# Patient Record
Sex: FEMALE | Race: BLACK OR AFRICAN AMERICAN | NOT HISPANIC OR LATINO | Employment: FULL TIME | URBAN - METROPOLITAN AREA
[De-identification: names, ages, dates, MRNs, and addresses within clinical notes are randomized per-mention and may not be internally consistent; named-entity substitution may affect disease eponyms.]

---

## 2018-01-29 ENCOUNTER — HOSPITAL ENCOUNTER (EMERGENCY)
Facility: HOSPITAL | Age: 33
Discharge: HOME | End: 2018-01-30
Attending: EMERGENCY MEDICINE
Payer: COMMERCIAL

## 2018-01-29 DIAGNOSIS — K62.5 RECTAL BLEEDING: Primary | ICD-10-CM

## 2018-01-29 DIAGNOSIS — K57.30 DIVERTICULA OF COLON: ICD-10-CM

## 2018-01-29 LAB
ANION GAP SERPL CALC-SCNC: 8 MMOL/L
B-HCG UR QL: NEGATIVE
BUN SERPL-MCNC: 4 MG/DL
CALCIUM SERPL-MCNC: 9.3 MG/DL
CHLORIDE SERPL-SCNC: 104 MMOL/L
CO2 SERPL-SCNC: 27 MMOL/L
CREAT SERPL-MCNC: 0.8 MG/DL
CTP QC/QA: YES
EST. GFR  (AFRICAN AMERICAN): >60 ML/MIN/1.73 M^2
EST. GFR  (NON AFRICAN AMERICAN): >60 ML/MIN/1.73 M^2
GLUCOSE SERPL-MCNC: 101 MG/DL
POTASSIUM SERPL-SCNC: 3.5 MMOL/L
SODIUM SERPL-SCNC: 139 MMOL/L

## 2018-01-29 PROCEDURE — 81025 URINE PREGNANCY TEST: CPT | Performed by: EMERGENCY MEDICINE

## 2018-01-29 PROCEDURE — 80048 BASIC METABOLIC PNL TOTAL CA: CPT

## 2018-01-29 PROCEDURE — 85025 COMPLETE CBC W/AUTO DIFF WBC: CPT

## 2018-01-29 PROCEDURE — 99284 EMERGENCY DEPT VISIT MOD MDM: CPT | Mod: 25

## 2018-01-30 VITALS
HEART RATE: 64 BPM | HEIGHT: 69 IN | TEMPERATURE: 98 F | OXYGEN SATURATION: 100 % | RESPIRATION RATE: 16 BRPM | BODY MASS INDEX: 37.03 KG/M2 | DIASTOLIC BLOOD PRESSURE: 78 MMHG | SYSTOLIC BLOOD PRESSURE: 129 MMHG | WEIGHT: 250 LBS

## 2018-01-30 LAB
BASOPHILS # BLD AUTO: 0.01 K/UL
BASOPHILS NFR BLD: 0.2 %
DIFFERENTIAL METHOD: ABNORMAL
EOSINOPHIL # BLD AUTO: 0.2 K/UL
EOSINOPHIL NFR BLD: 3.7 %
ERYTHROCYTE [DISTWIDTH] IN BLOOD BY AUTOMATED COUNT: 15.7 %
HCT VFR BLD AUTO: 36.7 %
HGB BLD-MCNC: 11.9 G/DL
LYMPHOCYTES # BLD AUTO: 1.6 K/UL
LYMPHOCYTES NFR BLD: 36 %
MCH RBC QN AUTO: 26.2 PG
MCHC RBC AUTO-ENTMCNC: 32.4 G/DL
MCV RBC AUTO: 81 FL
MONOCYTES # BLD AUTO: 0.4 K/UL
MONOCYTES NFR BLD: 9.6 %
NEUTROPHILS # BLD AUTO: 2.3 K/UL
NEUTROPHILS NFR BLD: 50.9 %
PLATELET # BLD AUTO: 254 K/UL
PMV BLD AUTO: 12.7 FL
RBC # BLD AUTO: 4.55 M/UL
WBC # BLD AUTO: 4.56 K/UL

## 2018-01-30 NOTE — ED PROVIDER NOTES
"Encounter Date: 1/29/2018    SCRIBE #1 NOTE: I, Benjamin Lozano, am scribing for, and in the presence of,  Herbie Mora III, MD. I have scribed the following portions of the note - Other sections scribed: HPI, ROS.       History     Chief Complaint   Patient presents with    Rectal Bleeding     x3 days, lower abd pain intermittent (crampy feeling) that started Sunday; shooting abd pains Sunday, but not now; denies hx hemmorrhoids     CC: Rectal Bleeding    HPI: This 32 y.o. Female with no pertinent PMHx presents to the ED c/o rectal bleeding, anal pain, lower abdominal shooting pain, diarrhea, blood in stool and nausea which began x3 days ago.  She reports "I feel like I have to BM but just bleeding occurs."  Pt has not taken any medications to relieve her symptoms. He denies emesis, dysuria or hemtauria.      The history is provided by the patient. No  was used.     Review of patient's allergies indicates:   Allergen Reactions    Iodine and iodide containing products Swelling     History reviewed. No pertinent past medical history.  History reviewed. No pertinent surgical history.  History reviewed. No pertinent family history.  Social History   Substance Use Topics    Smoking status: Current Every Day Smoker     Packs/day: 0.50     Years: 10.00     Types: Cigarettes    Smokeless tobacco: Never Used    Alcohol use Yes      Comment: ocassionally     Review of Systems   Constitutional: Negative for chills and fever.   HENT: Negative for ear pain, rhinorrhea and sore throat.    Eyes: Negative for redness.   Respiratory: Negative for shortness of breath.    Cardiovascular: Negative for chest pain.   Gastrointestinal: Positive for abdominal pain (lower), anal bleeding (and pain), blood in stool, diarrhea and nausea. Negative for vomiting.   Genitourinary: Negative for dysuria and hematuria.   Musculoskeletal: Negative for back pain and neck pain.   Skin: Negative for rash.   Neurological: " Negative for weakness, numbness and headaches.   Hematological: Does not bruise/bleed easily.   Psychiatric/Behavioral: The patient is not nervous/anxious.        Physical Exam     Initial Vitals [01/29/18 1923]   BP Pulse Resp Temp SpO2   (!) 141/82 100 16 98.3 °F (36.8 °C) 99 %      MAP       101.67         Physical Exam    Nursing note and vitals reviewed.  Constitutional: She appears well-developed and well-nourished. She is not diaphoretic. No distress.   HENT:   Head: Normocephalic and atraumatic.   Nose: Nose normal.   Mouth/Throat: Oropharynx is clear and moist. No oropharyngeal exudate.   Eyes: Conjunctivae and EOM are normal. Pupils are equal, round, and reactive to light. No scleral icterus.   Neck: Normal range of motion. Neck supple. No thyromegaly present. No tracheal deviation present.   Cardiovascular: Normal rate, regular rhythm and normal heart sounds. Exam reveals no gallop and no friction rub.    No murmur heard.  Pulmonary/Chest: Breath sounds normal. No respiratory distress. She has no wheezes. She has no rhonchi. She has no rales.   Abdominal: Soft. Bowel sounds are normal. She exhibits no distension and no mass. There is tenderness (slight LLQ). There is no rebound and no guarding.   Musculoskeletal: Normal range of motion. She exhibits no edema or tenderness.   Lymphadenopathy:     She has no cervical adenopathy.   Neurological: She is alert and oriented to person, place, and time. She has normal strength. No cranial nerve deficit or sensory deficit.   Skin: Skin is warm and dry. No rash noted. No erythema. No pallor.   Psychiatric: She has a normal mood and affect. Her behavior is normal. Thought content normal.         ED Course   Procedures  Labs Reviewed   CBC W/ AUTO DIFFERENTIAL - Abnormal; Notable for the following:        Result Value    Hemoglobin 11.9 (*)     Hematocrit 36.7 (*)     MCV 81 (*)     MCH 26.2 (*)     RDW 15.7 (*)     All other components within normal limits   BASIC  METABOLIC PANEL - Abnormal; Notable for the following:     BUN, Bld 4 (*)     All other components within normal limits   POCT URINE PREGNANCY             Medical Decision Making:   Initial Assessment:   32-year-old female presents complaining of bright red blood per rectum with bowel movements as detailed above.  Work this began with a cramping low abdominal pain but that this has largely resolved.  Physical examination reveals slight left lower quadrant tenderness and no other significant abnormalities.  Patient has no signs or symptoms of anemia.  Differential Diagnosis:   Diverticulosis, diverticulitis.  Hemorrhoids are considered much less likely given bleeding only occurs with bowel movements.  Malignancy considered less likely given young age.  Infectious enteritis felt less likely given lack of vomiting and risk factors for bacterial infection.  Independently Interpreted Test(s):   I have ordered and independently interpreted X-rays - see summary below.       <> Summary of X-Ray Reading(s): CT abdomen: Diverticulosis with no other abnormalities  ED Management:  Workup does not reveal any acute pathology.  Hemoglobin 11.9 and patient reports that she is chronically anemic.  CT with diverticulosis though without any evidence of diverticulitis.  Will recommend follow-up with gastroenterology for colonoscopy and provided return precautions for developing signs of severe anemia, diverticulitis, or other abdominal emergency.    Patient counseled regarding test results, recommendations for supportive care, and need for follow-up.  Return precautions given.              Scribe Attestation:   Scribe #1: I performed the above scribed service and the documentation accurately describes the services I performed. I attest to the accuracy of the note.    Attending Attestation:           Physician Attestation for Scribe:  Physician Attestation Statement for Scribe #1: I, Herbie Mora III, MD, reviewed documentation, as  scribed by Benjamin Lozano in my presence, and it is both accurate and complete.                 ED Course      Clinical Impression:   The primary encounter diagnosis was Rectal bleeding. A diagnosis of Diverticula of colon was also pertinent to this visit.                           Herbie Mora III, MD  01/30/18 0315

## 2018-01-30 NOTE — DISCHARGE INSTRUCTIONS
Return to the emergency department if you develop worsening pain, worsening bleeding, severe lightheadedness, fainting, shortness of breath with exertion, fever higher than 100.4°, or for any new or worsening medical concerns.  As discussed, you need to follow-up with a gastroenterologist to have a colonoscopy.

## 2018-06-12 ENCOUNTER — HOSPITAL ENCOUNTER (EMERGENCY)
Facility: HOSPITAL | Age: 33
Discharge: HOME | End: 2018-06-12
Attending: EMERGENCY MEDICINE
Payer: COMMERCIAL

## 2018-06-12 VITALS
OXYGEN SATURATION: 99 % | HEIGHT: 65 IN | HEART RATE: 73 BPM | SYSTOLIC BLOOD PRESSURE: 124 MMHG | DIASTOLIC BLOOD PRESSURE: 79 MMHG | WEIGHT: 250 LBS | BODY MASS INDEX: 41.65 KG/M2 | RESPIRATION RATE: 18 BRPM | TEMPERATURE: 99 F

## 2018-06-12 DIAGNOSIS — R07.9 RIGHT-SIDED CHEST PAIN: ICD-10-CM

## 2018-06-12 DIAGNOSIS — M54.9 UPPER BACK PAIN ON RIGHT SIDE: Primary | ICD-10-CM

## 2018-06-12 DIAGNOSIS — M25.511 ACUTE PAIN OF RIGHT SHOULDER: ICD-10-CM

## 2018-06-12 LAB
B-HCG UR QL: NEGATIVE
CTP QC/QA: YES

## 2018-06-12 PROCEDURE — 99284 EMERGENCY DEPT VISIT MOD MDM: CPT | Mod: 25

## 2018-06-12 PROCEDURE — 63600175 PHARM REV CODE 636 W HCPCS: Performed by: PHYSICIAN ASSISTANT

## 2018-06-12 PROCEDURE — 81025 URINE PREGNANCY TEST: CPT | Performed by: PHYSICIAN ASSISTANT

## 2018-06-12 PROCEDURE — 96372 THER/PROPH/DIAG INJ SC/IM: CPT

## 2018-06-12 RX ORDER — KETOROLAC TROMETHAMINE 30 MG/ML
15 INJECTION, SOLUTION INTRAMUSCULAR; INTRAVENOUS
Status: COMPLETED | OUTPATIENT
Start: 2018-06-12 | End: 2018-06-12

## 2018-06-12 RX ORDER — ORPHENADRINE CITRATE 100 MG/1
100 TABLET, EXTENDED RELEASE ORAL 2 TIMES DAILY
Qty: 20 TABLET | Refills: 0 | Status: SHIPPED | OUTPATIENT
Start: 2018-06-12 | End: 2018-06-22

## 2018-06-12 RX ORDER — ORPHENADRINE CITRATE 30 MG/ML
60 INJECTION INTRAMUSCULAR; INTRAVENOUS
Status: COMPLETED | OUTPATIENT
Start: 2018-06-12 | End: 2018-06-12

## 2018-06-12 RX ORDER — IBUPROFEN 600 MG/1
600 TABLET ORAL EVERY 6 HOURS PRN
Qty: 20 TABLET | Refills: 0 | Status: SHIPPED | OUTPATIENT
Start: 2018-06-12

## 2018-06-12 RX ADMIN — KETOROLAC TROMETHAMINE 15 MG: 30 INJECTION, SOLUTION INTRAMUSCULAR; INTRAVENOUS at 01:06

## 2018-06-12 RX ADMIN — ORPHENADRINE CITRATE 60 MG: 30 INJECTION INTRAMUSCULAR; INTRAVENOUS at 01:06

## 2018-06-12 NOTE — ED PROVIDER NOTES
Encounter Date: 6/12/2018    SCRIBE #1 NOTE: I, Martita Acosta, am scribing for, and in the presence of,  Rich Amin PA-C. I have scribed the following portions of the note - Other sections scribed: HPI and ROS.       History     Chief Complaint   Patient presents with    Back Pain     pt c/o right upper back/shoulder pain since yest, hurts to move, she thinks its from lifting at work     CC: Back Pain    HPI: This 33 y.o female presents to the ED c/o gradual onset, constant right upper back pain radiating to under her right breast that began yesterday afternoon. Patient also reports of right sided neck pain. Patient reports the pain began after lifting 3 boxes at work.  Patient reports the pain is worse with movement, sneezing, coughing, and deep breaths.  Patient reports of some relief of her pain last night with taking 2 Ibuprofen tablets.  Patient denies fever, chills, shortness of breath, extremity numbness, extremity weakness, rash, chills, or any other associated symptoms.  Patient reports of intermittent bilateral leg swelling, which she attributes to working 80+ hour work weeks and being on her feet.  Patient denies any falls, trauma, injuries, periods of immobilizations, surgeries, long distance travels, or oral contraceptive therapy.        The history is provided by the patient. No  was used.     Review of patient's allergies indicates:   Allergen Reactions    Iodine and iodide containing products Swelling     History reviewed. No pertinent past medical history.  History reviewed. No pertinent surgical history.  History reviewed. No pertinent family history.  Social History   Substance Use Topics    Smoking status: Current Every Day Smoker     Packs/day: 0.50     Years: 10.00     Types: Cigarettes    Smokeless tobacco: Never Used    Alcohol use Yes      Comment: ocassionally     Review of Systems   Constitutional: Negative for chills and fever.   HENT: Negative for ear pain and  sore throat.    Eyes: Negative for pain.   Respiratory: Negative for cough and shortness of breath.    Cardiovascular: Negative for chest pain.   Gastrointestinal: Negative for abdominal pain, diarrhea, nausea and vomiting.   Genitourinary: Negative for dysuria.   Musculoskeletal: Positive for arthralgias, back pain and neck pain.   Skin: Negative for rash.   Neurological: Negative for weakness, numbness and headaches.       Physical Exam     Initial Vitals [06/12/18 1208]   BP Pulse Resp Temp SpO2   (!) 174/85 98 18 98.6 °F (37 °C) 100 %      MAP       --         Physical Exam    Vitals reviewed.  Constitutional: She appears well-developed and well-nourished. She is not diaphoretic. No distress.   HENT:   Head: Normocephalic and atraumatic.   Right Ear: External ear normal.   Left Ear: External ear normal.   Nose: Nose normal.   Eyes: Conjunctivae are normal. No scleral icterus.   Neck: Normal range of motion. Neck supple.   Cardiovascular: Normal rate, regular rhythm, normal heart sounds and intact distal pulses.   Pulmonary/Chest: Breath sounds normal. No respiratory distress. She has no wheezes. She has no rhonchi. She has no rales. She exhibits tenderness.   Right lateral chest tenderness   Abdominal: Soft. She exhibits no distension and no mass. There is no tenderness. There is no rebound, no guarding, no CVA tenderness and negative Guillen's sign.   Musculoskeletal: She exhibits no edema or tenderness.   Right shoulder pain with ROM   Lymphadenopathy:     She has no cervical adenopathy.   Neurological: She is alert and oriented to person, place, and time. She has normal strength. No sensory deficit.   Skin: Skin is warm and dry. No rash noted. No erythema.         ED Course   Procedures  Labs Reviewed   POCT URINE PREGNANCY          X-Ray Chest PA And Lateral   Final Result      Unremarkable two views of the chest without evidence of acute process         Electronically signed by: Alex Schmidt MD    Date:    06/12/2018   Time:    13:06        X-Rays:   Independently Interpreted Readings:   Chest X-Ray: Normal heart size.  No infiltrates.  No acute abnormalities. No PTX     Medical Decision Making:   Initial Assessment:   Right shoulder and right side chest pain, significantly worsened with movement. Lifted heavy boxes prior to onset. No SOB, fever, cough, or evidence of DVT. PERC neg. Chest xray without evidence of PTX or other acute cardiopulmomary process. Low suspicion for serious pathology.   ED Management:  Treated with NSAID and muscle relaxer with improved symptoms. She was discharged with continued symptomatic management, return precautions, and instructions to f/u with PCP. She verbalized understanding and agreed with plan.             Scribe Attestation:   Scribe #1: I performed the above scribed service and the documentation accurately describes the services I performed. I attest to the accuracy of the note.    Attending Attestation:           Physician Attestation for Scribe:  Physician Attestation Statement for Scribe #1: I, Rich Amin PA-C, reviewed documentation, as scribed by Martita Acosta in my presence, and it is both accurate and complete.                    Clinical Impression:   The primary encounter diagnosis was Upper back pain on right side. Diagnoses of Right-sided chest pain and Acute pain of right shoulder were also pertinent to this visit.                             Rich Amin PA-C  06/12/18 3529